# Patient Record
Sex: MALE | URBAN - METROPOLITAN AREA
[De-identification: names, ages, dates, MRNs, and addresses within clinical notes are randomized per-mention and may not be internally consistent; named-entity substitution may affect disease eponyms.]

---

## 2022-05-09 ENCOUNTER — EMERGENCY (EMERGENCY)
Facility: HOSPITAL | Age: 42
LOS: 1 days | Discharge: HOME | End: 2022-05-09
Attending: EMERGENCY MEDICINE | Admitting: EMERGENCY MEDICINE
Payer: MEDICARE

## 2022-05-09 VITALS
HEART RATE: 115 BPM | TEMPERATURE: 98 F | RESPIRATION RATE: 16 BRPM | WEIGHT: 240.08 LBS | OXYGEN SATURATION: 97 % | HEIGHT: 75 IN | SYSTOLIC BLOOD PRESSURE: 137 MMHG | DIASTOLIC BLOOD PRESSURE: 89 MMHG

## 2022-05-09 DIAGNOSIS — M79.10 MYALGIA, UNSPECIFIED SITE: ICD-10-CM

## 2022-05-09 DIAGNOSIS — Z59.00 HOMELESSNESS UNSPECIFIED: ICD-10-CM

## 2022-05-09 DIAGNOSIS — H53.8 OTHER VISUAL DISTURBANCES: ICD-10-CM

## 2022-05-09 PROCEDURE — 99284 EMERGENCY DEPT VISIT MOD MDM: CPT

## 2022-05-09 RX ORDER — ACETAMINOPHEN 500 MG
650 TABLET ORAL ONCE
Refills: 0 | Status: COMPLETED | OUTPATIENT
Start: 2022-05-09 | End: 2022-05-09

## 2022-05-09 RX ADMIN — Medication 650 MILLIGRAM(S): at 23:58

## 2022-05-09 SDOH — ECONOMIC STABILITY - HOUSING INSECURITY: HOMELESSNESS UNSPECIFIED: Z59.00

## 2022-05-09 NOTE — ED ADULT NURSE NOTE - OBJECTIVE STATEMENT
Pt states "I am recently homeless. For 3 days. And since then my feet are hurting and my knees sometimes hurt".

## 2022-05-10 VITALS
RESPIRATION RATE: 16 BRPM | SYSTOLIC BLOOD PRESSURE: 135 MMHG | DIASTOLIC BLOOD PRESSURE: 84 MMHG | HEART RATE: 97 BPM | OXYGEN SATURATION: 98 %

## 2022-05-10 PROCEDURE — 99282 EMERGENCY DEPT VISIT SF MDM: CPT

## 2022-05-10 PROCEDURE — 82962 GLUCOSE BLOOD TEST: CPT

## 2022-05-10 NOTE — ED PROVIDER NOTE - OBJECTIVE STATEMENT
42 M undomiciled, denies pmh p/w body pain and blurred vision x 3 days.  pt reports he has been homeless with nowhere to go for past 3 days, now his body hurts and his eyes burn w/ occasional blurred vision -states he hasn't been sleeping well.  denies flashes/floaters, FB sensation, eye pain or trauma.  requesting to lay down for a bit.  denies f/c, headache, dizziness, chest pain, sob, abd pain, nvd, limited ROM ext, trauma

## 2022-05-10 NOTE — ED PROVIDER NOTE - PHYSICAL EXAMINATION
Vitals reviewed  Gen: disheveled appearing, nad, speaking in full sentences  Skin: wwp, calluses and blistering to b/l feet without any erythema/warmth or fluctuance   HEENT: ncat, eomi, perrla, no nystagmus, 20/20 VA b/l uncorrected, no FB, b/l sclera injected, IOP 12 b/l, mmm  CV: rrr, no audible m/r/g  Resp: symmetrical expansion, ctab, no w/r/r  Abd: nondistended, soft/nt  Ext: FROM throughout, no peripheral edema/calf ttp, distal pulses 2+, SILT, cap refill < 2 sec   Neuro: alert/oriented, no focal deficits, steady gait

## 2022-05-10 NOTE — ED PROVIDER NOTE - NS ED ATTENDING STATEMENT MOD
This was a shared visit with the FRAN. I reviewed and verified the documentation and independently performed the documented:

## 2022-05-10 NOTE — ED PROVIDER NOTE - CLINICAL SUMMARY MEDICAL DECISION MAKING FREE TEXT BOX
42 M undomiciled, denies pmh p/w body pain and blurred vision x 3 days after difficulty sleeping due to homeless over past 3 days.  on exam pt disheveled, HEENT: ncat, eomi, perrla, no nystagmus, 20/20 VA b/l uncorrected, no FB, b/l sclera injected, IOP 12 b/l, mmm, blisters/calluses to b/l feet w/o e/o infection.  pt given tylenol and list of shelters.  no acute intervention needed at this time.

## 2022-05-10 NOTE — ED PROVIDER NOTE - NSFOLLOWUPINSTRUCTIONS_ED_ALL_ED_FT
Take tylenol 650mg or motrin 400-800mg as needed every 4-6 hours for pain.     Follow up with your primary care doctor or clinics listed below if you do not have a doctor,    [address] [address]  To make an appointment, call [phone]    [facility]  Address: [address] [place] [id number]  Appointment Center: [phone] ([phone])     [address] [place] [address] is a good referral line for crisis and substance abuse help.  [facility] has drop in programs all over the [facility].    Return to the ER for Emergencies.  Return immediately for any new or worsening symptoms or any new concerns

## 2022-05-10 NOTE — ED PROVIDER NOTE - PATIENT PORTAL LINK FT
You can access the FollowMyHealth Patient Portal offered by [facility] by registering at the following website: [email or web address]. By joining [facility]’s FollowMyHealth portal, you will also be able to view your health information using other applications (apps) compatible with our system.

## 2022-05-10 NOTE — ED PROVIDER NOTE - ATTENDING APP SHARED VISIT CONTRIBUTION OF CARE
Pt is undomiciled, denies PMHx p/w body pain and int blurred vision x 3 days.  pt reports he has been homeless with nowhere to go for past 3 days, now his body hurts and his eyes burn w/ occasional blurred vision -states he hasn't been sleeping well.  denies flashes/floaters, FB sensation, eye pain or trauma.  requesting to lay down for a bit.  denies f/c, headache, dizziness, chest pain, sob, abd pain, nvd, limited ROM ext, trauma  Eye exam performed by SHAMA Maya w/o findings. discussed & reviewed. FS wnl.   [facility] list Addendum to attending statement: I have reviewed the ACP note and agree with the history, exam, and plan of care. I  was available to SHAMA Maya  as a supervising provider if needed. PA given opportunity to ask questions and request further evaluation / care.   Pt is undomiciled, denies PMHx p/w body pain and int blurred vision x 3 days.  pt reports he has been homeless with nowhere to go for past 3 days, now his body hurts and his eyes burn w/ occasional blurred vision -states he hasn't been sleeping well.  denies flashes/floaters, FB sensation, eye pain or trauma.  requesting to lay down for a bit.  denies f/c, headache, dizziness, chest pain, sob, abd pain, nvd, limited ROM ext, trauma  Eye exam performed by SHAMA Maya w/o findings. discussed & reviewed. FS wnl.   Tylenol  Shelter list